# Patient Record
(demographics unavailable — no encounter records)

---

## 2025-02-07 NOTE — ADDENDUM
[FreeTextEntry1] : I, Michael Vallecillo, acted as a scribe on behalf of Dr. Dylan Jaime MD, on 02/06/2025.   All medical entries made by the scribe were at my, Dr. Dylan Jaime MD, direction and personally dictated by me on 02/06/2025. I have reviewed the chart and agree that the record accurately reflects my personal performance of the history, physical exam, assessment and plan. I have also personally directed, reviewed, and agreed with the chart.

## 2025-02-07 NOTE — HEALTH RISK ASSESSMENT
[Good] : ~his/her~  mood as  good [Yes] : Yes [No] : In the past 12 months have you used drugs other than those required for medical reasons? No [0] : 2) Feeling down, depressed, or hopeless: Not at all (0) [PHQ-2 Negative - No further assessment needed] : PHQ-2 Negative - No further assessment needed [Never] : Never [NO] : No [FreeTextEntry1] : health maintenance [de-identified] : social [[id number]] : 0 [BoneDensityComments] : n/a [ColonoscopyComments] : n/a

## 2025-02-07 NOTE — HEALTH RISK ASSESSMENT
[Good] : ~his/her~  mood as  good [Yes] : Yes [No] : In the past 12 months have you used drugs other than those required for medical reasons? No [0] : 2) Feeling down, depressed, or hopeless: Not at all (0) [PHQ-2 Negative - No further assessment needed] : PHQ-2 Negative - No further assessment needed [Never] : Never [NO] : No [FreeTextEntry1] : health maintenance [de-identified] : social [[id number]] : 0 [BoneDensityComments] : n/a [ColonoscopyComments] : n/a

## 2025-02-07 NOTE — PHYSICAL EXAM
[Normal] : normal gait, coordination grossly intact, no focal deficits and deep tendon reflexes were 2+ and symmetric [de-identified] : Multiple nevi

## 2025-02-07 NOTE — HISTORY OF PRESENT ILLNESS
[FreeTextEntry1] : Patient is present today to establish care and for a comprehensive Annual Physical Exam. [de-identified] : Patient is a 49yr old male who is present today to establish care and for a comprehensive Annual Physical Exam.  Patient is doing well overall and has not gotten sick since last visit. Saw dermatology for skin exam some atypia seen on biopsy will be returning for further removal Up-to-date with colonoscopy Attempting to watch diet   Denies any CP, chest tightness or SOB. Denies any abdominal pain, urinary symptom, or change in bowel habits. Denies any fever, chills, or night sweats.

## 2025-02-07 NOTE — ASSESSMENT
[FreeTextEntry1] :  Annual Physical Exam - BP is stable. Continue current management. - Check A1c, lipid panels, vitamin levels, urine analysis, TSH   - RTO annually or as needed.   Pt verbalized understanding and will reach should any questions/concerns occur.

## 2025-02-07 NOTE — PHYSICAL EXAM
[Normal] : normal gait, coordination grossly intact, no focal deficits and deep tendon reflexes were 2+ and symmetric [de-identified] : Multiple nevi

## 2025-02-07 NOTE — HISTORY OF PRESENT ILLNESS
[FreeTextEntry1] : Patient is present today to establish care and for a comprehensive Annual Physical Exam. [de-identified] : Patient is a 49yr old male who is present today to establish care and for a comprehensive Annual Physical Exam.  Patient is doing well overall and has not gotten sick since last visit. Saw dermatology for skin exam some atypia seen on biopsy will be returning for further removal Up-to-date with colonoscopy Attempting to watch diet   Denies any CP, chest tightness or SOB. Denies any abdominal pain, urinary symptom, or change in bowel habits. Denies any fever, chills, or night sweats.